# Patient Record
Sex: FEMALE | Race: WHITE | NOT HISPANIC OR LATINO | Employment: FULL TIME | URBAN - METROPOLITAN AREA
[De-identification: names, ages, dates, MRNs, and addresses within clinical notes are randomized per-mention and may not be internally consistent; named-entity substitution may affect disease eponyms.]

---

## 2022-05-02 ENCOUNTER — APPOINTMENT (OUTPATIENT)
Dept: RADIOLOGY | Facility: MEDICAL CENTER | Age: 47
End: 2022-05-02
Attending: EMERGENCY MEDICINE
Payer: COMMERCIAL

## 2022-05-02 ENCOUNTER — HOSPITAL ENCOUNTER (EMERGENCY)
Facility: MEDICAL CENTER | Age: 47
End: 2022-05-02
Attending: EMERGENCY MEDICINE
Payer: COMMERCIAL

## 2022-05-02 VITALS
RESPIRATION RATE: 23 BRPM | HEIGHT: 66 IN | BODY MASS INDEX: 37.45 KG/M2 | OXYGEN SATURATION: 96 % | TEMPERATURE: 97.3 F | SYSTOLIC BLOOD PRESSURE: 121 MMHG | DIASTOLIC BLOOD PRESSURE: 74 MMHG | WEIGHT: 233.03 LBS | HEART RATE: 95 BPM

## 2022-05-02 DIAGNOSIS — N39.0 URINARY TRACT INFECTION WITHOUT HEMATURIA, SITE UNSPECIFIED: ICD-10-CM

## 2022-05-02 DIAGNOSIS — R11.0 NAUSEA: ICD-10-CM

## 2022-05-02 DIAGNOSIS — R10.9 ABDOMINAL PAIN, UNSPECIFIED ABDOMINAL LOCATION: ICD-10-CM

## 2022-05-02 LAB
ALBUMIN SERPL BCP-MCNC: 3.5 G/DL (ref 3.2–4.9)
ALBUMIN/GLOB SERPL: 1.2 G/DL
ALP SERPL-CCNC: 82 U/L (ref 30–99)
ALT SERPL-CCNC: 22 U/L (ref 2–50)
ANION GAP SERPL CALC-SCNC: 11 MMOL/L (ref 7–16)
APPEARANCE UR: ABNORMAL
AST SERPL-CCNC: 21 U/L (ref 12–45)
BACTERIA #/AREA URNS HPF: ABNORMAL /HPF
BASOPHILS # BLD AUTO: 0.5 % (ref 0–1.8)
BASOPHILS # BLD: 0.04 K/UL (ref 0–0.12)
BILIRUB SERPL-MCNC: 0.4 MG/DL (ref 0.1–1.5)
BILIRUB UR QL STRIP.AUTO: ABNORMAL
BUN SERPL-MCNC: 9 MG/DL (ref 8–22)
CALCIUM SERPL-MCNC: 8.6 MG/DL (ref 8.4–10.2)
CHLORIDE SERPL-SCNC: 99 MMOL/L (ref 96–112)
CO2 SERPL-SCNC: 21 MMOL/L (ref 20–33)
COLOR UR: YELLOW
CREAT SERPL-MCNC: 0.58 MG/DL (ref 0.5–1.4)
EOSINOPHIL # BLD AUTO: 0.1 K/UL (ref 0–0.51)
EOSINOPHIL NFR BLD: 1.3 % (ref 0–6.9)
EPI CELLS #/AREA URNS HPF: ABNORMAL /HPF
ERYTHROCYTE [DISTWIDTH] IN BLOOD BY AUTOMATED COUNT: 47.7 FL (ref 35.9–50)
GFR SERPLBLD CREATININE-BSD FMLA CKD-EPI: 112 ML/MIN/1.73 M 2
GLOBULIN SER CALC-MCNC: 2.9 G/DL (ref 1.9–3.5)
GLUCOSE SERPL-MCNC: 119 MG/DL (ref 65–99)
GLUCOSE UR STRIP.AUTO-MCNC: NEGATIVE MG/DL
HCG SERPL QL: NEGATIVE
HCT VFR BLD AUTO: 39.1 % (ref 37–47)
HGB BLD-MCNC: 13.4 G/DL (ref 12–16)
IMM GRANULOCYTES # BLD AUTO: 0.03 K/UL (ref 0–0.11)
IMM GRANULOCYTES NFR BLD AUTO: 0.4 % (ref 0–0.9)
KETONES UR STRIP.AUTO-MCNC: NEGATIVE MG/DL
LEUKOCYTE ESTERASE UR QL STRIP.AUTO: ABNORMAL
LIPASE SERPL-CCNC: 16 U/L (ref 7–58)
LYMPHOCYTES # BLD AUTO: 0.67 K/UL (ref 1–4.8)
LYMPHOCYTES NFR BLD: 8.6 % (ref 22–41)
MCH RBC QN AUTO: 29.8 PG (ref 27–33)
MCHC RBC AUTO-ENTMCNC: 34.3 G/DL (ref 33.6–35)
MCV RBC AUTO: 87.1 FL (ref 81.4–97.8)
MICRO URNS: ABNORMAL
MONOCYTES # BLD AUTO: 0.39 K/UL (ref 0–0.85)
MONOCYTES NFR BLD AUTO: 5 % (ref 0–13.4)
MUCOUS THREADS #/AREA URNS HPF: ABNORMAL /HPF
NEUTROPHILS # BLD AUTO: 6.58 K/UL (ref 2–7.15)
NEUTROPHILS NFR BLD: 84.2 % (ref 44–72)
NITRITE UR QL STRIP.AUTO: NEGATIVE
NRBC # BLD AUTO: 0 K/UL
NRBC BLD-RTO: 0 /100 WBC
PH UR STRIP.AUTO: 6 [PH] (ref 5–8)
PLATELET # BLD AUTO: 210 K/UL (ref 164–446)
PMV BLD AUTO: 9.3 FL (ref 9–12.9)
POTASSIUM SERPL-SCNC: 3.2 MMOL/L (ref 3.6–5.5)
PROT SERPL-MCNC: 6.4 G/DL (ref 6–8.2)
PROT UR QL STRIP: NEGATIVE MG/DL
RBC # BLD AUTO: 4.49 M/UL (ref 4.2–5.4)
RBC # URNS HPF: ABNORMAL /HPF
RBC UR QL AUTO: NEGATIVE
SODIUM SERPL-SCNC: 131 MMOL/L (ref 135–145)
SP GR UR STRIP.AUTO: 1.02
UNIDENT CRYS URNS QL MICRO: ABNORMAL /HPF
WBC # BLD AUTO: 7.8 K/UL (ref 4.8–10.8)
WBC #/AREA URNS HPF: ABNORMAL /HPF

## 2022-05-02 PROCEDURE — 99285 EMERGENCY DEPT VISIT HI MDM: CPT

## 2022-05-02 PROCEDURE — 36415 COLL VENOUS BLD VENIPUNCTURE: CPT

## 2022-05-02 PROCEDURE — 700117 HCHG RX CONTRAST REV CODE 255: Performed by: EMERGENCY MEDICINE

## 2022-05-02 PROCEDURE — 96374 THER/PROPH/DIAG INJ IV PUSH: CPT

## 2022-05-02 PROCEDURE — 80053 COMPREHEN METABOLIC PANEL: CPT

## 2022-05-02 PROCEDURE — 96375 TX/PRO/DX INJ NEW DRUG ADDON: CPT

## 2022-05-02 PROCEDURE — 84703 CHORIONIC GONADOTROPIN ASSAY: CPT

## 2022-05-02 PROCEDURE — A9270 NON-COVERED ITEM OR SERVICE: HCPCS | Performed by: EMERGENCY MEDICINE

## 2022-05-02 PROCEDURE — 700102 HCHG RX REV CODE 250 W/ 637 OVERRIDE(OP): Performed by: EMERGENCY MEDICINE

## 2022-05-02 PROCEDURE — 83690 ASSAY OF LIPASE: CPT

## 2022-05-02 PROCEDURE — 87186 SC STD MICRODIL/AGAR DIL: CPT

## 2022-05-02 PROCEDURE — 87077 CULTURE AEROBIC IDENTIFY: CPT

## 2022-05-02 PROCEDURE — 700111 HCHG RX REV CODE 636 W/ 250 OVERRIDE (IP): Performed by: EMERGENCY MEDICINE

## 2022-05-02 PROCEDURE — 85025 COMPLETE CBC W/AUTO DIFF WBC: CPT

## 2022-05-02 PROCEDURE — 87086 URINE CULTURE/COLONY COUNT: CPT

## 2022-05-02 PROCEDURE — 81001 URINALYSIS AUTO W/SCOPE: CPT

## 2022-05-02 PROCEDURE — 74177 CT ABD & PELVIS W/CONTRAST: CPT

## 2022-05-02 RX ORDER — ONDANSETRON 2 MG/ML
4 INJECTION INTRAMUSCULAR; INTRAVENOUS ONCE
Status: COMPLETED | OUTPATIENT
Start: 2022-05-02 | End: 2022-05-02

## 2022-05-02 RX ORDER — MORPHINE SULFATE 4 MG/ML
4 INJECTION INTRAVENOUS ONCE
Status: COMPLETED | OUTPATIENT
Start: 2022-05-02 | End: 2022-05-02

## 2022-05-02 RX ORDER — CEFDINIR 300 MG/1
300 CAPSULE ORAL 2 TIMES DAILY
Qty: 14 CAPSULE | Refills: 0 | Status: SHIPPED | OUTPATIENT
Start: 2022-05-02 | End: 2022-05-09

## 2022-05-02 RX ORDER — CEFDINIR 300 MG/1
300 CAPSULE ORAL ONCE
Status: COMPLETED | OUTPATIENT
Start: 2022-05-02 | End: 2022-05-02

## 2022-05-02 RX ORDER — PHENAZOPYRIDINE HYDROCHLORIDE 200 MG/1
200 TABLET, FILM COATED ORAL 3 TIMES DAILY PRN
Qty: 6 TABLET | Refills: 0 | Status: SHIPPED | OUTPATIENT
Start: 2022-05-02

## 2022-05-02 RX ORDER — ONDANSETRON 4 MG/1
4 TABLET, ORALLY DISINTEGRATING ORAL EVERY 6 HOURS PRN
Qty: 20 TABLET | Refills: 0 | Status: SHIPPED | OUTPATIENT
Start: 2022-05-02

## 2022-05-02 RX ORDER — PHENAZOPYRIDINE HYDROCHLORIDE 200 MG/1
100 TABLET, FILM COATED ORAL ONCE
Status: COMPLETED | OUTPATIENT
Start: 2022-05-02 | End: 2022-05-02

## 2022-05-02 RX ADMIN — CEFDINIR 300 MG: 300 CAPSULE ORAL at 08:10

## 2022-05-02 RX ADMIN — ONDANSETRON 4 MG: 2 INJECTION INTRAMUSCULAR; INTRAVENOUS at 06:09

## 2022-05-02 RX ADMIN — MORPHINE SULFATE 4 MG: 4 INJECTION INTRAVENOUS at 06:09

## 2022-05-02 RX ADMIN — PHENAZOPYRIDINE HYDROCHLORIDE 100 MG: 200 TABLET ORAL at 08:00

## 2022-05-02 RX ADMIN — IOHEXOL 100 ML: 350 INJECTION, SOLUTION INTRAVENOUS at 07:17

## 2022-05-02 ASSESSMENT — ENCOUNTER SYMPTOMS
FEVER: 0
SHORTNESS OF BREATH: 0
CHILLS: 0
ABDOMINAL PAIN: 1
FLANK PAIN: 1
VOMITING: 0
NAUSEA: 1
DIZZINESS: 0

## 2022-05-02 ASSESSMENT — PAIN DESCRIPTION - PAIN TYPE: TYPE: ACUTE PAIN

## 2022-05-02 ASSESSMENT — PAIN DESCRIPTION - DESCRIPTORS: DESCRIPTORS: SHARP;CRAMPING

## 2022-05-02 NOTE — LETTER
5/5/2022               Brianda Arenas  [address] [place] [address] [address]  [facility] [address]        Dear Brianda (MR#[id number])    As we have been unable to contact you by phone, this letter is sent in regards to your recent visit to the [facility] Emergency Department on 5/2/2022. During the visit, tests were performed to assist the physician in a medical diagnosis. A review of those tests requires that we notify you of the following:    Your urine culture and sensitivity was POSITIVE for a bacteria called ESBL Escherichia coli, and further treatment may be necessary. The currently prescribed antibiotic (cefdinir) may not be effective in treating your infection. IF YOU ARE NOT FEELING BETTER PLEASE CONTACT ME AS SOON AS POSSIBLE AT THE NUMBER BELOW.       Thank you for your cooperation in the matter.    Sincerely,  ED Culture Follow-Up Staff  Ping Emanuel, PharmD, BCPS    PGY2 Infectious Diseases Pharmacy Resident      [facility], Emergency Department  [address] [place] [id number] [phone] (Ping's Phone Number)  [phone] (ED Culture Line)

## 2022-05-02 NOTE — ED NOTES
RLQ pain for the past 3 days with increasing radiation to left abd and bilateral lower back. Intermittent Nausea without vomiting. Burning, frequent urination. Possible constipation.

## 2022-05-02 NOTE — ED TRIAGE NOTES
"Chief Complaint   Patient presents with   • Abdominal Pain     RLQ pain for the past 3 days with increasing radiation to left abd and bilateral lower back. Intermittent Nausea without vomiting. Burning, frequent urination.        /90   Pulse 96   Temp 36.2 °C (97.1 °F) (Temporal)   Resp 18   Ht 1.676 m (5' 6\")   Wt 106 kg (233 lb 0.4 oz)   LMP 03/31/2022   SpO2 96%   BMI 37.61 kg/m²     "

## 2022-05-02 NOTE — DISCHARGE INSTRUCTIONS
As we discussed your CT scan showed a lot of incidental findings.  You will need your uterine mass followed up by your gynecologist.  You will need your pulmonary nodules followed up by your primary care doctor

## 2022-05-02 NOTE — ED PROVIDER NOTES
"ED Provider Note  Primary care provider: None  Means of arrival: private vehicle  History obtained from: patient  History limited by: none    CHIEF COMPLAINT  Chief Complaint   Patient presents with   • Abdominal Pain     RLQ pain for the past 3 days with increasing radiation to left abd and bilateral lower back. Intermittent Nausea without vomiting. Burning, frequent urination.        HPI  Brianda Arenas is a 47 y.o. female who presents to the Emergency Department for evaluation of abdominal pain. Patient reports that yesterday she started to have right lower quadrant aching into her hip and flank.  She reports associated dysuria and urinary frequency.  She also reports associated nausea without vomiting.  Pain has worsened throughout the night came in for evaluation this morning.  And therefore she came in for evaluation this morning.  Prior abdominal surgical history notable for Cholecystectomy.    REVIEW OF SYSTEMS  Review of Systems   Constitutional: Negative for chills and fever.   Respiratory: Negative for shortness of breath.    Cardiovascular: Negative for chest pain.   Gastrointestinal: Positive for abdominal pain and nausea. Negative for vomiting.   Genitourinary: Positive for dysuria and flank pain.   Neurological: Negative for dizziness.   All other systems reviewed and are negative.        PAST MEDICAL HISTORY   HTN, ADHD, depression, PCOS    SURGICAL HISTORY  patient denies any surgical history    SOCIAL HISTORY      Social History     Substance and Sexual Activity   Drug Use Not on file       FAMILY HISTORY  None pertinent    CURRENT MEDICATIONS  See medication list    ALLERGIES  No Known Allergies    PHYSICAL EXAM  VITAL SIGNS: /90   Pulse 96   Temp 36.2 °C (97.1 °F) (Temporal)   Resp 18   Ht 1.676 m (5' 6\")   Wt 106 kg (233 lb 0.4 oz)   LMP 03/31/2022   SpO2 96%   BMI 37.61 kg/m²   Vitals reviewed by myself.  Physical Exam  Nursing note and vitals reviewed.  Constitutional: " Well-developed and well-nourished. No acute distress.   HENT: Head is normocephalic and atraumatic.  Eyes: extra-ocular movements intact  Cardiovascular: regular rate and  regular rhythm. No murmur heard.  Pulmonary/Chest: Breath sounds normal. No wheezes or rales.   Abdominal: Soft with mild tenderness to palpation of the right lower quadrant, no rebound or guarding  Musculoskeletal: Extremities exhibit normal range of motion without edema or tenderness.   Neurological: Awake and alert  Skin: Skin is warm and dry. No rash.         DIAGNOSTIC STUDIES /  LABS  Labs Reviewed   CBC WITH DIFFERENTIAL - Abnormal; Notable for the following components:       Result Value    Neutrophils-Polys 84.20 (*)     Lymphocytes 8.60 (*)     Lymphs (Absolute) 0.67 (*)     All other components within normal limits   COMP METABOLIC PANEL - Abnormal; Notable for the following components:    Sodium 131 (*)     Potassium 3.2 (*)     Glucose 119 (*)     All other components within normal limits   URINALYSIS,CULTURE IF INDICATED - Abnormal; Notable for the following components:    Character Sl Cloudy (*)     Bilirubin Small (*)     Leukocyte Esterase Moderate (*)     All other components within normal limits    Narrative:     Indication for culture:->Patient WITHOUT an indwelling Patel  catheter in place with new onset of Dysuria, Frequency,  Urgency, and/or Suprapubic pain   URINE MICROSCOPIC (W/UA) - Abnormal; Notable for the following components:    WBC 20-50 (*)     RBC 5-10 (*)     Bacteria Moderate (*)     All other components within normal limits    Narrative:     Indication for culture:->Patient WITHOUT an indwelling Patel  catheter in place with new onset of Dysuria, Frequency,  Urgency, and/or Suprapubic pain   LIPASE   HCG QUAL SERUM   ESTIMATED GFR   URINE CULTURE(NEW)    Narrative:     Indication for culture:->Patient WITHOUT an indwelling Patel  catheter in place with new onset of Dysuria, Frequency,  Urgency, and/or Suprapubic  pain       All labs reviewed by me.      RADIOLOGY  CT-ABDOMEN-PELVIS WITH   Final Result      1.  No evidence of acute inflammatory process in the abdomen or pelvis   2.  7.9 cm uterine mass, likely a myoma   3.  3.9 cm LEFT adnexal cyst   4.  Prior cholecystectomy   5.  Surgical fusion at L4-L5   6.  Small area of scar in the LEFT kidney   7.  LEFT lower lobe pulmonary nodules measuring up to 5 mm      RECOMMENDATION FOR PULMONARY NODULE EVALUATION:   Low Risk: No routine follow-up      High Risk: Optional CT at 12 months      Comments: Use most suspicious nodule as guide to management. Follow-up intervals may vary according to size and risk.      Low Risk - Minimal or absent history of smoking and of other known risk factors.      High Risk - History of smoking or of other known risk factors.      Note: These recommendations do not apply to lung cancer screening, patients with immunosuppression, or patients with known primary cancer.      Fleischner Society 2017 Guidelines for Management of Incidentally Detected Pulmonary Nodules in Adults           The radiologist's interpretation of all radiological studies have been reviewed by me.          COURSE & MEDICAL DECISION MAKING  Nursing notes, VS, PMSFHx reviewed in chart.    Patient is a 47-year-old female who comes in for evaluation of abdominal pain.  Differential diagnosis includes urinary tract infection, nephrolithiasis, appendicitis, gastroenteritis.  Diagnostic work-up includes labs, urinalysis and CT of the abdomen.    Patient's initial vitals are within normal limits.  Her pain is managed with morphine and Zofran after which patient is feeling improved urinary tract infection, patient will be started on cefdinir and Pyridium for management of her symptoms.  CT returns and demonstrates no acute findings, patient does have incidental findings of uterine mass, ovarian cysts, pulmonary nodule which she is advised will need to be followed up with primary and  gynecology.  She is amenable to this plan.  She will be started on cefdinir and Pyridium and Zofran for management of her symptoms at home.  She is then given strict return precautions and discharged in stable condition.      FINAL IMPRESSION  1. Urinary tract infection without hematuria, site unspecified    2. Abdominal pain, unspecified abdominal location    3. Nausea

## 2022-05-04 LAB
BACTERIA UR CULT: ABNORMAL
BACTERIA UR CULT: ABNORMAL
SIGNIFICANT IND 70042: ABNORMAL
SITE SITE: ABNORMAL
SOURCE SOURCE: ABNORMAL

## 2022-05-04 NOTE — ED NOTES
"ED Positive Culture Follow-up/Notification Note:   Date: 5/4/2022    Patient seen in the ED on 5/2/2022 for abdominal pain, dysuria, urinary frequency and flank pain   .   1. Urinary tract infection without hematuria, site unspecified    2. Abdominal pain, unspecified abdominal location    3. Nausea      Discharge Medication List as of 5/2/2022  8:15 AM      START taking these medications    Details   cefdinir (OMNICEF) 300 MG Cap Take 1 Capsule by mouth 2 times a day for 7 days., Disp-14 Capsule, R-0, Normal      ondansetron (ZOFRAN ODT) 4 MG TABLET DISPERSIBLE Take 1 Tablet by mouth every 6 hours as needed for Nausea., Disp-20 Tablet, R-0, Normal      phenazopyridine (PYRIDIUM) 200 MG Tab Take 1 Tablet by mouth 3 times a day as needed for Moderate Pain or Severe Pain., Disp-6 Tablet, R-0, Normal           Allergies: Patient has no known allergies.    Vitals:    05/02/22 0511 05/02/22 0643 05/02/22 0723 05/02/22 0740   BP: 130/90 128/70 121/74    Pulse: 96 95 96 95   Resp: 18 16 20 (!) 23   Temp: 36.2 °C (97.1 °F)  36.3 °C (97.3 °F)    TempSrc: Temporal      SpO2: 96% 97% 98% 96%   Weight: 106 kg (233 lb 0.4 oz)      Height: 1.676 m (5' 6\")          Final cultures:   Results     Procedure Component Value Units Date/Time    URINE CULTURE(NEW) [[id number]]  (Abnormal)  (Susceptibility) Collected: 05/02/22 0527    Order Status: Completed Specimen: Urine Updated: 05/04/22 0723     Significant Indicator POS     Source UR     Site -     Culture Result -      Escherichia coli ESBL  >100,000 cfu/mL      Narrative:      Indication for culture:->Patient WITHOUT an indwelling Patel  catheter in place with new onset of Dysuria, Frequency,  Urgency, and/or Suprapubic pain    Susceptibility     Escherichia coli esbl (1)     Antibiotic Interpretation Microscan   Method Status    Amikacin  [*]  Sensitive <=16 mcg/mL ROXANA Final    Ampicillin Resistant >16 mcg/mL ROAXNA Final    Amoxicillin/CA  [*]  Sensitive <=8/4 mcg/mL ROXANA Final    " Aztreonam  [*]  Resistant >16 mcg/mL ROXANA Final    Ceftolozane+Tazobactam  [*]  Sensitive <=2 mcg/mL ROXANA Final    Ceftriaxone Resistant >32 mcg/mL ROXANA Final    Ceftazidime  [*]  Resistant >16 mcg/mL ROXANA Final    Cefazolin Resistant >16 mcg/mL ROXANA Final     Breakpoints when Cefazolin is used for therapy of infections  other than uncomplicated UTIs due to Enterobacterales are as  follows:  ROXANA and Interpretation:  <=2 S  4 I  >=8 R       Ciprofloxacin Resistant >2 mcg/mL ROXANA Final    Cefepime Resistant >16 mcg/mL ROXANA Final    Cefuroxime Resistant >16 mcg/mL ROXANA Final    Ceftazidime+Avibactam  [*]  Sensitive <=4 mcg/mL ROXANA Final    Ampicillin/sulbactam Resistant >16/8 mcg/mL ROXANA Final    Ertapenem  [*]  Sensitive <=0.5 mcg/mL ROXANA Final    Tobramycin Sensitive <=2 mcg/mL ROXANA Final    Nitrofurantoin Resistant >64 mcg/mL ROXANA Final    Gentamicin Sensitive <=2 mcg/mL ROXANA Final    Imipenem  [*]  Sensitive <=1 mcg/mL ROXANA Final    Levofloxacin Resistant >4 mcg/mL ROXANA Final    Meropenem  [*]  Sensitive <=1 mcg/mL ROXANA Final    Meropenem/Vaborbactam  [*]  Sensitive <=2 mcg/mL ROXANA Final    Minocycline Sensitive <=4 mcg/mL ROXANA Final    Pip/Tazobactam Sensitive <=8 mcg/mL ROXANA Final    Trimeth/Sulfa Sensitive <=0.5/9.5 mcg/mL ROXANA Final    Tetracycline  [*]  Sensitive <=4 mcg/mL ROXANA Final    Tigecycline Sensitive <=2 mcg/mL ROXANA Final           [*]  Suppressed Antibiotic                 URINALYSIS,CULTURE IF INDICATED [[id number]]  (Abnormal) Collected: 05/02/22 0527    Order Status: Completed Specimen: Urine Updated: 05/02/22 0629     Color Yellow     Character Sl Cloudy     Specific Gravity 1.025     Ph 6.0     Glucose Negative mg/dL      Ketones Negative mg/dL      Protein Negative mg/dL      Bilirubin Small     Nitrite Negative     Leukocyte Esterase Moderate     Occult Blood Negative     Micro Urine Req Microscopic    Narrative:      Indication for culture:->Patient WITHOUT an indwelling Patel  catheter in place with new onset of  Dysuria, Frequency,  Urgency, and/or Suprapubic pain          Plan:   Isolated organism is resistant to prescribed therapy. Attempted to call patient to discuss results and symptoms, LVM. Will send letter to patient as well.     If patient returns call, would recommend:     New Rx:  Bactrim DS 1 tab PO BID x7 days #14, no refills - MD: Km per protocol    Ping Emanuel, PharmD

## 2022-05-18 ENCOUNTER — TELEPHONE (OUTPATIENT)
Dept: OBGYN | Facility: CLINIC | Age: 47
End: 2022-05-18
Payer: COMMERCIAL

## 2022-05-18 NOTE — TELEPHONE ENCOUNTER
Attempted to contact pt regarding to whom she needs to follow up with from the ED. Pt to follow up w/ Family Medicine. Unable to leave voicemail, mailbox full.